# Patient Record
Sex: MALE
[De-identification: names, ages, dates, MRNs, and addresses within clinical notes are randomized per-mention and may not be internally consistent; named-entity substitution may affect disease eponyms.]

---

## 2018-05-07 ENCOUNTER — HOSPITAL ENCOUNTER (EMERGENCY)
Dept: HOSPITAL 25 - UCEAST | Age: 58
Discharge: HOME | End: 2018-05-07
Payer: MEDICARE

## 2018-05-07 VITALS — SYSTOLIC BLOOD PRESSURE: 131 MMHG | DIASTOLIC BLOOD PRESSURE: 88 MMHG

## 2018-05-07 DIAGNOSIS — Z88.8: ICD-10-CM

## 2018-05-07 DIAGNOSIS — F79: ICD-10-CM

## 2018-05-07 DIAGNOSIS — Z04.1: Primary | ICD-10-CM

## 2018-05-07 PROCEDURE — G0463 HOSPITAL OUTPT CLINIC VISIT: HCPCS

## 2018-05-07 PROCEDURE — 99212 OFFICE O/P EST SF 10 MIN: CPT
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
